# Patient Record
(demographics unavailable — no encounter records)

---

## 2024-11-08 NOTE — HISTORY OF PRESENT ILLNESS
[de-identified] : tooth pain [FreeTextEntry6] : 13 year old  male here with tooth pain  HPI: started hurting 2 days ago after going to the Dentist here is school States the dentist put a filling in Pain level now is 7/10

## 2024-11-08 NOTE — DISCUSSION/SUMMARY
[FreeTextEntry1] : Toothache  Plan TC to Mom: she will follow up with dentist if pain persists Pain medication given

## 2024-12-10 NOTE — HISTORY OF PRESENT ILLNESS
[de-identified] : headache [FreeTextEntry6] : 13 year old male with headache  States he had a headache this morning and it went away and  now after lunch it has returned No fever, sore throat, nasal congestion, cough, or body aches  Pain level now: 7/10 Ate a good breakfast and lunch today  Reviewed annual behavioral health history Home: 8th grade in [employer] Lives wtih parents and 17 year old brother Ed; 8th gradei in [employer] Sexual hx: one partner about one year ago.  States he used a condom No substance use

## 2024-12-10 NOTE — DISCUSSION/SUMMARY
[FreeTextEntry1] : Headache  Plan Medication given Increase PO fluid intake and rest. Discussed importance of eating a healthy breakfast and lunch. Stress management and sleep hygiene discussed. TC to parent: message left on Mom's voicemail

## 2025-02-12 NOTE — RISK ASSESSMENT
[Little interest or pleasure doing things] : 1) Little interest or pleasure doing things [Feeling down, depressed, or hopeless] : 2) Feeling down, depressed, or hopeless [0] : 2) Feeling down, depressed, or hopeless: Not at all (0) [PHQ-2 Negative - No further assessment needed] : PHQ-2 Negative - No further assessment needed [[id number]] : 0

## 2025-02-12 NOTE — HISTORY OF PRESENT ILLNESS
[Yes] : Patient goes to dentist yearly [Toothpaste] : Primary Fluoride Source: Toothpaste [Up to date] : Up to date [Eats meals with family] : eats meals with family [Has family members/adults to turn to for help] : has family members/adults to turn to for help [Is permitted and is able to make independent decisions] : Is permitted and is able to make independent decisions [FreeTextEntry1] : 14 year old here for well child exam HPI: He is feeling well today   PMH: no significant PMH Allergies Dental   Home: 8th grade in [employer] Lives with parents and 20 year old brother Ed; 8th grade in [employer]; passing all classes Sexual hx: one partner about one year ago. States he used a condom No substance use Mental health: denies feelings of sadness/anxiety Activity: he is on the basketball team

## 2025-02-12 NOTE — DISCUSSION/SUMMARY
[Normal Growth] : growth [Normal Development] : development  [No Elimination Concerns] : elimination [Continue Regimen] : feeding [No Skin Concerns] : skin [Normal Sleep Pattern] : sleep [None] : no medical problems [Anticipatory Guidance Given] : Anticipatory guidance addressed as per the history of present illness section [Physical Growth and Development] : physical growth and development [Social and Academic Competence] : social and academic competence [Emotional Well-Being] : emotional well-being [Risk Reduction] : risk reduction [Violence and Injury Prevention] : violence and injury prevention [No Vaccines] : no vaccines needed [No Medications] : ~He/She~ is not on any medications [Patient] : patient [Parent/Guardian] : Parent/Guardian [FreeTextEntry1] : Well   adolescent.  - Dental caries Plan - Needs vaccines:   VIS and consent form sent home for Flu and HPV  -Counseled regarding dental hygiene, pubertal changes, seatbelt safety, and internet safety. Healthy eating habits, exercise and keeping recreational screen time to less than 2 hours a day discussed. - Infection prevention discussed - Bright Futures Parent handout sent home  - Need for dental care discussed List of dental clinics sent home           Visit summary sent home

## 2025-02-12 NOTE — PHYSICAL EXAM

## 2025-03-20 NOTE — HISTORY OF PRESENT ILLNESS
[FreeTextEntry6] : 14 year old here for vaccine  HPI:   Feeling well today with no fever, respiratory or GI concerns No previous reaction to vaccines VIS and consent sent previously [de-identified] : vaccine needed

## 2025-03-20 NOTE — DISCUSSION/SUMMARY
[FreeTextEntry1] : Vaccine needed  Plan HPV Vaccine administered. Vaccine education done.  Updated CIR copy given Monitored X10 minutes and returned to class. Anticipatory guidance given [] : The components of the vaccine(s) to be administered today are listed in the plan of care. The disease(s) for which the vaccine(s) are intended to prevent and the risks have been discussed with the caretaker.  The risks are also included in the appropriate vaccination information statements which have been provided to the patient's caregiver.  The caregiver has given consent to vaccinate.